# Patient Record
Sex: FEMALE | Race: WHITE | NOT HISPANIC OR LATINO | Employment: OTHER | URBAN - METROPOLITAN AREA
[De-identification: names, ages, dates, MRNs, and addresses within clinical notes are randomized per-mention and may not be internally consistent; named-entity substitution may affect disease eponyms.]

---

## 2025-03-27 ENCOUNTER — OFFICE VISIT (OUTPATIENT)
Dept: UROLOGY | Facility: CLINIC | Age: 65
End: 2025-03-27
Payer: COMMERCIAL

## 2025-03-27 DIAGNOSIS — N20.0 KIDNEY STONE: Primary | ICD-10-CM

## 2025-03-27 DIAGNOSIS — R39.9 URINARY SYMPTOM OR SIGN: ICD-10-CM

## 2025-03-27 DIAGNOSIS — R82.90 ABNORMAL FINDING ON URINALYSIS: ICD-10-CM

## 2025-03-27 DIAGNOSIS — N30.00 ACUTE CYSTITIS WITHOUT HEMATURIA: ICD-10-CM

## 2025-03-27 DIAGNOSIS — N20.0 KIDNEY STONES: ICD-10-CM

## 2025-03-27 LAB
POC APPEARANCE, URINE: CLEAR
POC BILIRUBIN, URINE: NEGATIVE
POC BLOOD, URINE: ABNORMAL
POC COLOR, URINE: YELLOW
POC GLUCOSE, URINE: NEGATIVE MG/DL
POC KETONES, URINE: NEGATIVE MG/DL
POC LEUKOCYTES, URINE: ABNORMAL
POC NITRITE,URINE: NEGATIVE
POC PH, URINE: 5.5 PH
POC PROTEIN, URINE: NEGATIVE MG/DL
POC SPECIFIC GRAVITY, URINE: 1.02
POC UROBILINOGEN, URINE: 0.2 EU/DL

## 2025-03-27 PROCEDURE — 99204 OFFICE O/P NEW MOD 45 MIN: CPT | Performed by: UROLOGY

## 2025-03-27 PROCEDURE — 1036F TOBACCO NON-USER: CPT | Performed by: UROLOGY

## 2025-03-27 PROCEDURE — 81003 URINALYSIS AUTO W/O SCOPE: CPT | Performed by: UROLOGY

## 2025-03-27 NOTE — PROGRESS NOTES
"  Patient is a 65 y.o. female presenting with gross hematuria, recurrent UTI, kidney stones, and left Bosniak 2F renal cyst.    SUBJECTIVE:  HPI   She presents as a new patient  Her friend is in attendance.  She has had recurrent UTI symptoms with gross hematuria  She presented to urgent care recently for UTI symptoms and is currently taking nitrofurantoin .   Urine culture was not sent per patient.  Her symptoms are improving.  She denies flank pain.  She has had a prior UTI with gross hematuria as well.    She has history of kidney stones.   Renal ultrasound in 12/24 identified a suspected 5 mm left upper pole stone, and 6 mm left lower pole stone.    She has history of bilateral hydronephrosis in the setting of diverticulitis. She has a colostomy.  She has met with Dr. Peters of nephrology in the past; however, has not followed routinely.    No past medical history on file.  No past surgical history on file.   No family history on file.   Tobacco Use: Low Risk  (3/27/2025)    Patient History     Smoking Tobacco Use: Never     Smokeless Tobacco Use: Never     Passive Exposure: Not on file     Review of Systems     OBJECTIVE:  There were no vitals taken for this visit.  Physical Exam     LABS:  Lab Results   Component Value Date    GLUCOSE 139 (H) 12/08/2022    CALCIUM 9.4 12/08/2022     12/08/2022    K 4.3 12/08/2022    CO2 30 12/08/2022    CL 98 12/08/2022    BUN 17 12/08/2022    CREATININE 0.8 12/08/2022     No results found for: \"URINECULTURE\"   IMAGING:  ==12/04/24==  US renal complete   IMPRESSION:   -Left lower pole Bosniak 2F renal cyst, minimally increased in size   compared to prior, but otherwise similar in appearance.   -Possible left nephrolithiasis.  No hydronephrosis.     PROCEDURES:    ASSESSMENT/PLAN:  Problem List Items Addressed This Visit    None  Visit Diagnoses       Urinary symptom or sign        Relevant Orders    POCT UA Automated manually resulted (Completed)    Abnormal finding on " urinalysis        Relevant Orders    Urine Culture    Microscopic Only, Urine           She has had recurrent UTI's. She is currently under treatment with nitrofurantoin. UA had moderate blood and trace leukocytes today.     She has suspected 5 mm left upper pole stone, and 6 mm left lower pole stone identified on renal ultrasound 12/04/24. She will have CT AP without contrast.    She has history of left lower pole Bosniak 2F renal cyst identified on renal ultrasound 12/04/24.     She has history of bilateral hydronephrosis associated with diverticulitis. She has a colostomy.    She will follow up in 2-3 weeks with repeat urinalysis at that time.    All questions were answered to the patient’s satisfaction.  Patient agrees with the plan and wishes to proceed.  Follow-up will be scheduled appropriately.     Scribe Attestation  By signing my name below, I, Christiano Foley,   attest that this documentation has been prepared under the direction and in the presence of Darnell Mcneal MD.

## 2025-03-30 DIAGNOSIS — N30.00 ACUTE CYSTITIS WITHOUT HEMATURIA: Primary | ICD-10-CM

## 2025-03-30 LAB
BACTERIA #/AREA URNS HPF: ABNORMAL /HPF
BACTERIA UR CULT: ABNORMAL
HYALINE CASTS #/AREA URNS LPF: ABNORMAL /LPF
RBC #/AREA URNS HPF: ABNORMAL /HPF
SERVICE CMNT-IMP: ABNORMAL
SQUAMOUS #/AREA URNS HPF: ABNORMAL /HPF
WBC #/AREA URNS HPF: ABNORMAL /HPF

## 2025-03-30 RX ORDER — AMOXICILLIN 500 MG/1
500 CAPSULE ORAL EVERY 8 HOURS SCHEDULED
Qty: 21 CAPSULE | Refills: 0 | Status: SHIPPED | OUTPATIENT
Start: 2025-03-30 | End: 2025-04-06

## 2025-04-02 ENCOUNTER — TELEPHONE (OUTPATIENT)
Dept: UROLOGY | Facility: CLINIC | Age: 65
End: 2025-04-02
Payer: COMMERCIAL

## 2025-04-04 DIAGNOSIS — N30.00 ACUTE CYSTITIS WITHOUT HEMATURIA: Primary | ICD-10-CM

## 2025-04-04 RX ORDER — CIPROFLOXACIN 500 MG/1
500 TABLET ORAL 2 TIMES DAILY
Qty: 14 TABLET | Refills: 0 | Status: SHIPPED | OUTPATIENT
Start: 2025-04-04 | End: 2025-04-11

## 2025-04-11 ENCOUNTER — HOSPITAL ENCOUNTER (OUTPATIENT)
Dept: RADIOLOGY | Facility: CLINIC | Age: 65
Discharge: HOME | End: 2025-04-11
Payer: MEDICARE

## 2025-04-11 DIAGNOSIS — N20.0 KIDNEY STONES: ICD-10-CM

## 2025-04-11 PROCEDURE — 74176 CT ABD & PELVIS W/O CONTRAST: CPT

## 2025-04-11 NOTE — H&P (VIEW-ONLY)
Patient is a 65 y.o. female presenting for followup with [facility] of gross hematuria, recurrent UTI, kidney stones, and left Bosniak 2F renal cyst.    SUBJECTIVE:  HPI   She has not had any recent stones pass.     She has history of recurrent UTI symptoms with gross hematuria  She has had a prior UTI with gross hematuria as well.    She has history of kidney stones.   Renal ultrasound in 12/24 identified a suspected 5 mm left upper pole stone, and 6 mm left lower pole stone.    She has history of bilateral hydronephrosis in the setting of diverticulitis. She has a colostomy.  She has met with Dr. Peters of nephrology in the past; however, has not followed routinely.    No past medical history on file.  No past surgical history on file.   No family history on file.   Tobacco Use: Low Risk  (4/14/2025)    Patient History     Smoking Tobacco Use: Never     Smokeless Tobacco Use: Never     Passive Exposure: Not on file     Review of Systems     OBJECTIVE:  There were no vitals taken for this visit.  Physical Exam   Constitutional: No obvious distress.  Eyes: Non-injected conjunctiva, sclera clear, EOMI.  Ears/Nose/Mouth/Throat: No obvious drainage per ears or nose.  Cardiovascular: Extremities are warm and well perfused. No edema, cyanosis or pallor.  Respiratory: No audible wheezing/stridor; respirations do not appear labored.  Gastrointestinal: Abdomen soft, not distended.  Musculoskeletal: Normal ROM of extremities.  Skin: No obvious rashes or open sores.  Neurologic: Alert and oriented, CN 2-12 grossly intact.  Psychiatric: Answers questions appropriately with normal affect.  Hematologic/Lymphatic/Immunologic: No obvious bruises or sites of spontaneous bleeding.  Genitourinary: No CVA tenderness, bladder not palpable.     LABS:  Lab Results   Component Value Date    WBC 11.0 12/08/2022    HGB 12.0 12/08/2022    HCT 40.5 12/08/2022    MCV 83.7 12/08/2022     12/08/2022    GLUCOSE 139 (H) 12/08/2022    CALCIUM 9.4  12/08/2022     12/08/2022    K 4.3 12/08/2022    CO2 30 12/08/2022    CL 98 12/08/2022    BUN 17 12/08/2022    CREATININE 0.8 12/08/2022    EGFR 84 12/08/2022    URINECULTURE SEE NOTE (A) 03/27/2025          IMAGING:  ==12/04/24==  US renal complete   IMPRESSION:   -Left lower pole Bosniak 2F renal cyst, minimally increased in size   compared to prior, but otherwise similar in appearance.   -Possible left nephrolithiasis.  No hydronephrosis.     === 04/11/25 ===  CT ABDOMEN PELVIS WO IV CONTRAST (Preliminary)  This result has not been signed. Information might be incomplete.  - Impression -  1. There are two nonobstructive right renal calculi measuring up to 6  mm in the right renal inter pole. No hydronephrosis.  2. Postsurgical changes of partial colectomy and a left lower  quadrant colostomy with parastomal herniation without evidence of  obstruction or strangulation.  3. Cholelithiasis without evidence acute cholecystitis.  4. Colonic diverticulosis without diverticulitis.  5. Additional chronic findings as above.    PROCEDURES:    ASSESSMENT/PLAN:  Problem List Items Addressed This Visit    None  Visit Diagnoses       Urinary symptom or sign        Relevant Orders    POCT UA Automated manually resulted (Completed)    Abnormal finding on urinalysis        Relevant Orders    Urine Culture    Microscopic Only, Urine    Kidney stones        Relevant Orders    XR abdomen 1 view          She has had recurrent UTI's. She had a E.faecalis UTI 3/27/25 treated with nitrofurantoin. UA had moderate blood today and will be sent for microscopy, culture, and sensitivity..    She has suspected 5 mm left upper pole stone, and 6 mm left lower pole stone identified on renal ultrasound 12/04/24. She will have CT AP without contrast.  Noncontrasted CT AP was viewed today and identified a new 6 mm nonobstructive right renal inter pole calculus and a 2 mm calculus within the right renal lower pole. She will repeat KUB. She will  be scheduled for right ESWL.    We discussed treatment of the 6 mm stone with right ESWL. The procedure was reviewed in detail. Risks of the procedure including infection, bleeding, injury to kidney and need for repeat procedures were reviewed. She understands to hold NSAID's for 1 week and anticoagulants for 3 days prior to her procedure.     She has history of left lower pole Bosniak 2F renal cyst identified on renal ultrasound 12/04/24.     She has history of bilateral hydronephrosis associated with diverticulitis. She has a colostomy.    All questions were answered to the patient’s satisfaction.  Patient agrees with the plan and wishes to proceed.  Follow-up will be scheduled appropriately.     Scribe Attestation  By signing my name below, I, Christiano Foley,   attest that this documentation has been prepared under the direction and in the presence of Darnell Mcneal MD.

## 2025-04-11 NOTE — PROGRESS NOTES
Patient is a 65 y.o. female presenting for followup with [facility] of gross hematuria, recurrent UTI, kidney stones, and left Bosniak 2F renal cyst.    SUBJECTIVE:  HPI   She has not had any recent stones pass.     She has history of recurrent UTI symptoms with gross hematuria  She has had a prior UTI with gross hematuria as well.    She has history of kidney stones.   Renal ultrasound in 12/24 identified a suspected 5 mm left upper pole stone, and 6 mm left lower pole stone.    She has history of bilateral hydronephrosis in the setting of diverticulitis. She has a colostomy.  She has met with Dr. Peters of nephrology in the past; however, has not followed routinely.    No past medical history on file.  No past surgical history on file.   No family history on file.   Tobacco Use: Low Risk  (4/14/2025)    Patient History     Smoking Tobacco Use: Never     Smokeless Tobacco Use: Never     Passive Exposure: Not on file     Review of Systems     OBJECTIVE:  There were no vitals taken for this visit.  Physical Exam   Constitutional: No obvious distress.  Eyes: Non-injected conjunctiva, sclera clear, EOMI.  Ears/Nose/Mouth/Throat: No obvious drainage per ears or nose.  Cardiovascular: Extremities are warm and well perfused. No edema, cyanosis or pallor.  Respiratory: No audible wheezing/stridor; respirations do not appear labored.  Gastrointestinal: Abdomen soft, not distended.  Musculoskeletal: Normal ROM of extremities.  Skin: No obvious rashes or open sores.  Neurologic: Alert and oriented, CN 2-12 grossly intact.  Psychiatric: Answers questions appropriately with normal affect.  Hematologic/Lymphatic/Immunologic: No obvious bruises or sites of spontaneous bleeding.  Genitourinary: No CVA tenderness, bladder not palpable.     LABS:  Lab Results   Component Value Date    WBC 11.0 12/08/2022    HGB 12.0 12/08/2022    HCT 40.5 12/08/2022    MCV 83.7 12/08/2022     12/08/2022    GLUCOSE 139 (H) 12/08/2022    CALCIUM 9.4  12/08/2022     12/08/2022    K 4.3 12/08/2022    CO2 30 12/08/2022    CL 98 12/08/2022    BUN 17 12/08/2022    CREATININE 0.8 12/08/2022    EGFR 84 12/08/2022    URINECULTURE SEE NOTE (A) 03/27/2025          IMAGING:  ==12/04/24==  US renal complete   IMPRESSION:   -Left lower pole Bosniak 2F renal cyst, minimally increased in size   compared to prior, but otherwise similar in appearance.   -Possible left nephrolithiasis.  No hydronephrosis.     === 04/11/25 ===  CT ABDOMEN PELVIS WO IV CONTRAST (Preliminary)  This result has not been signed. Information might be incomplete.  - Impression -  1. There are two nonobstructive right renal calculi measuring up to 6  mm in the right renal inter pole. No hydronephrosis.  2. Postsurgical changes of partial colectomy and a left lower  quadrant colostomy with parastomal herniation without evidence of  obstruction or strangulation.  3. Cholelithiasis without evidence acute cholecystitis.  4. Colonic diverticulosis without diverticulitis.  5. Additional chronic findings as above.    PROCEDURES:    ASSESSMENT/PLAN:  Problem List Items Addressed This Visit    None  Visit Diagnoses       Urinary symptom or sign        Relevant Orders    POCT UA Automated manually resulted (Completed)    Abnormal finding on urinalysis        Relevant Orders    Urine Culture    Microscopic Only, Urine    Kidney stones        Relevant Orders    XR abdomen 1 view          She has had recurrent UTI's. She had a E.faecalis UTI 3/27/25 treated with nitrofurantoin. UA had moderate blood today and will be sent for microscopy, culture, and sensitivity..    She has suspected 5 mm left upper pole stone, and 6 mm left lower pole stone identified on renal ultrasound 12/04/24. She will have CT AP without contrast.  Noncontrasted CT AP was viewed today and identified a new 6 mm nonobstructive right renal inter pole calculus and a 2 mm calculus within the right renal lower pole. She will repeat KUB. She will  be scheduled for right ESWL.    We discussed treatment of the 6 mm stone with right ESWL. The procedure was reviewed in detail. Risks of the procedure including infection, bleeding, injury to kidney and need for repeat procedures were reviewed. She understands to hold NSAID's for 1 week and anticoagulants for 3 days prior to her procedure.     She has history of left lower pole Bosniak 2F renal cyst identified on renal ultrasound 12/04/24.     She has history of bilateral hydronephrosis associated with diverticulitis. She has a colostomy.    All questions were answered to the patient’s satisfaction.  Patient agrees with the plan and wishes to proceed.  Follow-up will be scheduled appropriately.     Scribe Attestation  By signing my name below, I, Christiano Foley,   attest that this documentation has been prepared under the direction and in the presence of Darnell Mcneal MD.

## 2025-04-14 ENCOUNTER — APPOINTMENT (OUTPATIENT)
Dept: UROLOGY | Facility: CLINIC | Age: 65
End: 2025-04-14
Payer: COMMERCIAL

## 2025-04-14 ENCOUNTER — HOSPITAL ENCOUNTER (OUTPATIENT)
Dept: RADIOLOGY | Facility: HOSPITAL | Age: 65
Discharge: HOME | End: 2025-04-14
Payer: MEDICARE

## 2025-04-14 DIAGNOSIS — R82.90 ABNORMAL FINDING ON URINALYSIS: ICD-10-CM

## 2025-04-14 DIAGNOSIS — R39.9 URINARY SYMPTOM OR SIGN: ICD-10-CM

## 2025-04-14 DIAGNOSIS — N20.0 KIDNEY STONES: ICD-10-CM

## 2025-04-14 DIAGNOSIS — N20.0 RIGHT KIDNEY STONE: Primary | ICD-10-CM

## 2025-04-14 LAB
POC APPEARANCE, URINE: CLEAR
POC BILIRUBIN, URINE: NEGATIVE
POC BLOOD, URINE: ABNORMAL
POC COLOR, URINE: YELLOW
POC GLUCOSE, URINE: NEGATIVE MG/DL
POC KETONES, URINE: NEGATIVE MG/DL
POC LEUKOCYTES, URINE: NEGATIVE
POC NITRITE,URINE: NEGATIVE
POC PH, URINE: 5.5 PH
POC PROTEIN, URINE: NEGATIVE MG/DL
POC SPECIFIC GRAVITY, URINE: >=1.03
POC UROBILINOGEN, URINE: 0.2 EU/DL

## 2025-04-14 PROCEDURE — 74018 RADEX ABDOMEN 1 VIEW: CPT | Performed by: RADIOLOGY

## 2025-04-14 PROCEDURE — 1036F TOBACCO NON-USER: CPT | Performed by: UROLOGY

## 2025-04-14 PROCEDURE — 99213 OFFICE O/P EST LOW 20 MIN: CPT | Performed by: UROLOGY

## 2025-04-14 PROCEDURE — 74018 RADEX ABDOMEN 1 VIEW: CPT

## 2025-04-14 PROCEDURE — G2211 COMPLEX E/M VISIT ADD ON: HCPCS | Performed by: UROLOGY

## 2025-04-14 PROCEDURE — 81003 URINALYSIS AUTO W/O SCOPE: CPT | Performed by: UROLOGY

## 2025-04-14 ASSESSMENT — PAIN SCALES - GENERAL: PAINLEVEL_OUTOF10: 0-NO PAIN

## 2025-04-15 PROBLEM — N20.0 RIGHT KIDNEY STONE: Status: ACTIVE | Noted: 2025-04-14

## 2025-04-16 LAB
BACTERIA #/AREA URNS HPF: ABNORMAL /HPF
BACTERIA UR CULT: NORMAL
HYALINE CASTS #/AREA URNS LPF: ABNORMAL /LPF
RBC #/AREA URNS HPF: ABNORMAL /HPF
SERVICE CMNT-IMP: ABNORMAL
SQUAMOUS #/AREA URNS HPF: ABNORMAL /HPF
WBC #/AREA URNS HPF: ABNORMAL /HPF

## 2025-04-21 ENCOUNTER — LAB (OUTPATIENT)
Dept: LAB | Facility: HOSPITAL | Age: 65
End: 2025-04-21
Payer: MEDICARE

## 2025-04-21 ENCOUNTER — PRE-ADMISSION TESTING (OUTPATIENT)
Dept: PREADMISSION TESTING | Facility: HOSPITAL | Age: 65
End: 2025-04-21
Payer: MEDICARE

## 2025-04-21 VITALS
DIASTOLIC BLOOD PRESSURE: 89 MMHG | TEMPERATURE: 98.8 F | RESPIRATION RATE: 16 BRPM | SYSTOLIC BLOOD PRESSURE: 141 MMHG | WEIGHT: 174.6 LBS | HEIGHT: 61 IN | HEART RATE: 87 BPM | OXYGEN SATURATION: 98 % | BODY MASS INDEX: 32.97 KG/M2

## 2025-04-21 DIAGNOSIS — Z01.818 PREOP TESTING: Primary | ICD-10-CM

## 2025-04-21 DIAGNOSIS — N20.0 RIGHT KIDNEY STONE: ICD-10-CM

## 2025-04-21 DIAGNOSIS — Z01.818 ENCOUNTER FOR OTHER PREPROCEDURAL EXAMINATION: Primary | ICD-10-CM

## 2025-04-21 LAB
ALBUMIN SERPL BCP-MCNC: 4.4 G/DL (ref 3.4–5)
ALP SERPL-CCNC: 67 U/L (ref 33–136)
ALT SERPL W P-5'-P-CCNC: 15 U/L (ref 7–45)
ANION GAP SERPL CALC-SCNC: 15 MMOL/L (ref 10–20)
AST SERPL W P-5'-P-CCNC: 14 U/L (ref 9–39)
BASOPHILS # BLD AUTO: 0.05 X10*3/UL (ref 0–0.1)
BASOPHILS NFR BLD AUTO: 0.7 %
BILIRUB SERPL-MCNC: 0.4 MG/DL (ref 0–1.2)
BUN SERPL-MCNC: 17 MG/DL (ref 6–23)
CALCIUM SERPL-MCNC: 9.7 MG/DL (ref 8.6–10.3)
CHLORIDE SERPL-SCNC: 103 MMOL/L (ref 98–107)
CO2 SERPL-SCNC: 25 MMOL/L (ref 21–32)
CREAT SERPL-MCNC: 0.71 MG/DL (ref 0.5–1.05)
EGFRCR SERPLBLD CKD-EPI 2021: >90 ML/MIN/1.73M*2
EOSINOPHIL # BLD AUTO: 0.19 X10*3/UL (ref 0–0.7)
EOSINOPHIL NFR BLD AUTO: 2.6 %
ERYTHROCYTE [DISTWIDTH] IN BLOOD BY AUTOMATED COUNT: 13 % (ref 11.5–14.5)
GLUCOSE SERPL-MCNC: 99 MG/DL (ref 74–99)
HCT VFR BLD AUTO: 42.3 % (ref 36–46)
HGB BLD-MCNC: 13.4 G/DL (ref 12–16)
IMM GRANULOCYTES # BLD AUTO: 0.01 X10*3/UL (ref 0–0.7)
IMM GRANULOCYTES NFR BLD AUTO: 0.1 % (ref 0–0.9)
LYMPHOCYTES # BLD AUTO: 2.38 X10*3/UL (ref 1.2–4.8)
LYMPHOCYTES NFR BLD AUTO: 32.2 %
MCH RBC QN AUTO: 27 PG (ref 26–34)
MCHC RBC AUTO-ENTMCNC: 31.7 G/DL (ref 32–36)
MCV RBC AUTO: 85 FL (ref 80–100)
MONOCYTES # BLD AUTO: 0.46 X10*3/UL (ref 0.1–1)
MONOCYTES NFR BLD AUTO: 6.2 %
NEUTROPHILS # BLD AUTO: 4.31 X10*3/UL (ref 1.2–7.7)
NEUTROPHILS NFR BLD AUTO: 58.2 %
NRBC BLD-RTO: ABNORMAL /100{WBCS}
PLATELET # BLD AUTO: 342 X10*3/UL (ref 150–450)
POTASSIUM SERPL-SCNC: 4.9 MMOL/L (ref 3.5–5.3)
PROT SERPL-MCNC: 7.6 G/DL (ref 6.4–8.2)
RBC # BLD AUTO: 4.97 X10*6/UL (ref 4–5.2)
SODIUM SERPL-SCNC: 138 MMOL/L (ref 136–145)
WBC # BLD AUTO: 7.4 X10*3/UL (ref 4.4–11.3)

## 2025-04-21 PROCEDURE — 80053 COMPREHEN METABOLIC PANEL: CPT

## 2025-04-21 PROCEDURE — 99203 OFFICE O/P NEW LOW 30 MIN: CPT

## 2025-04-21 ASSESSMENT — DUKE ACTIVITY SCORE INDEX (DASI)
CAN YOU HAVE SEXUAL RELATIONS: YES
CAN YOU PARTICIPATE IN STRENOUS SPORTS LIKE SWIMMING, SINGLES TENNIS, FOOTBALL, BASKETBALL, OR SKIING: NO
CAN YOU RUN A SHORT DISTANCE: YES
CAN YOU PARTICIPATE IN MODERATE RECREATIONAL ACTIVITIES LIKE GOLF, BOWLING, DANCING, DOUBLES TENNIS OR THROWING A BASEBALL OR FOOTBALL: YES
CAN YOU DO HEAVY WORK AROUND THE HOUSE LIKE SCRUBBING FLOORS OR LIFTING AND MOVING HEAVY FURNITURE: NO
CAN YOU DO YARD WORK LIKE RAKING LEAVES, WEEDING OR PUSHING A MOWER: YES
CAN YOU WALK INDOORS, SUCH AS AROUND YOUR HOUSE: YES
DASI METS SCORE: 8
CAN YOU DO LIGHT WORK AROUND THE HOUSE LIKE DUSTING OR WASHING DISHES: YES
CAN YOU WALK A BLOCK OR TWO ON LEVEL GROUND: YES
CAN YOU TAKE CARE OF YOURSELF (EAT, DRESS, BATHE, OR USE TOILET): YES
TOTAL_SCORE: 42.7
CAN YOU DO MODERATE WORK AROUND THE HOUSE LIKE VACUUMING, SWEEPING FLOORS OR CARRYING GROCERIES: YES
CAN YOU CLIMB A FLIGHT OF STAIRS OR WALK UP A HILL: YES

## 2025-04-21 NOTE — PREPROCEDURE INSTRUCTIONS
Medication List      as of April 21, 2025  9:37 AM     You have not been prescribed any medications.       NPO Instructions:     Do not eat any food after midnight the night before your surgery/procedure.  You may have clear liquids until TWO hours before surgery/procedure. This includes water, black tea/coffee, (no milk or cream) apple juice and electrolyte drinks (Gatorade).  You may chew gum up to TWO hours before your surgery/procedure.     Additional Instructions:      Seven/Six Days before Surgery:  Review your medication instructions, stop indicated medications  Five Days before Surgery:  Review your medication instructions, stop indicated medications  Three Days before Surgery:  Review your medication instructions, stop indicated medications  The Day before Surgery:  No smoking or alcohol use 24 hours before surgery  Review your medication instructions, stop indicated medications  You will be contacted regarding the time of your arrival to facility and surgery time  Do not eat any food after Midnight  Day of Surgery:  Review your medication instructions, take indicated medications  If you have diabetes, please check your fasting blood sugar upon awakening.  If fasting blood sugar is <80 mg/dl, drink 100 ml of apple juice, time limit of 2 hours before  You may have clear liquids until TWO hours before surgery/procedure.  This includes water, black tea/coffee, (no milk or cream) apple juice and electrolyte drinks (Gatorade)  You may chew gum up to TWO hours before your surgery/procedure  Wear  comfortable loose fitting clothing  Do not use moisturizers, creams, lotions or perfume  All jewelry and valuables should be left at home     CONTACT SURGEON'S OFFICE IF YOU DEVELOP:  * Fever = 100.4 F   * New respiratory symptoms (e.g. cough, shortness of breath, respiratory distress, sore throat)  * Recent loss of taste or smell  *Flu like symptoms such as headache, fatigue or gastrointestinal symptoms  * You  develop any open sores, shingles, burning or painful urination   AND/OR:  * You no longer wish to have the surgery.  * Any other personal circumstances change that may lead to the need to cancel or defer this surgery.  *You were admitted to any hospital within one week of your planned procedure.     SMOKING:  *Quitting smoking can make a huge difference to your health and recovery from surgery.    *If you need help with quitting, call 4-602-QUIT-NOW.     THE DAY BEFORE SURGERY:  *Do not eat any food after midnight the night before your surgery.   *You may have up to TEN OUNCES of clear liquids until TWO hours before your instructed ARRIVAL TIME to hospital. This includes water, black tea/coffee, (no milk or cream) apple juice, clear broth and electrolyte drinks (Gatorade). Please avoid clear liquids that are red in color.   *You may chew gum/mints up to TWO hours before your surgery/procedure.     SURGICAL TIME:  *You will be contacted between 2 p.m. and 3 p.m. the business day before your surgery with your arrival time.  *If you haven't received a call by 3pm, call [phone]  *Scheduled surgery times may change and you will be notified if this occurs-check your personal voicemail for any updates.     ON THE MORNING OF SURGERY:  *Wear comfortable, loose fitting clothing.   *Do not use moisturizers, creams, lotions or perfume.  *All jewelry and valuables should be left at home.  *Prosthetic devices such as contact lenses, hearing aids, dentures, eyelash extensions, hairpins and body piercing must be removed before surgery.     BRING WITH YOU:  *Photo ID and insurance card  *Current list of medications and allergies  *Pacemaker/Defibrillator/Heart stent cards  *CPAP machine and mask  *Slings/splints/crutches  *Copy of your complete Advanced Directive/DHPOA-if applicable  *Neurostimulator implant remote     PARKING AND ARRIVAL:  *Check in at the Main Entrance desk and let them know you are here for surgery.     IF  YOU ARE HAVING OUTPATIENT/SAME DAY SURGERY:  *A responsible adult MUST accompany you at the time of discharge and stay with you for 24 hours after your surgery.  *You may NOT drive yourself home after surgery.  *You may use a taxi or ride sharing service ([employer], Uber) to return home ONLY if you are accompanied by a friend or family member.  *Instructions for resuming your medications will be provided by your surgeon.     Thank you for coming to Pre Admission testing.      If I have prescribed medication please don't forget to  at your pharmacy.      Any questions about today's visit call [phone] and leave a message in the general mailbox.     Patient instructed to ambulate as soon as possible postoperatively to decrease thromboembolic risk.     Sacha Morgan, APRN-CNP     Thank you for visiting [facility].  If you have any changes to your health condition, please call the surgeons office to alert them and give them details of your symptoms.        Preoperative Fasting Guidelines     Why must I stop eating and drinking near surgery time?  With sedation, food or liquid in your stomach can enter your lungs causing serious complications  Increases nausea and vomiting     When do I need to stop eating and drinking before my surgery?  Do not eat any food after midnight the night before your surgery/procedure.  You may have up to TEN ounces of clear liquid until TWO hours before your instructed arrival time to the hospital.  This includes water, black tea/coffee, (no milk or cream) apple juice, and electrolyte drinks (Gatorade)  You may chew gum until TWO hours before your surgery/procedure        Additional Instructions:      The Day before Surgery:  -Review your medication instructions, stop indicated medications  -You will be contacted in the evening regarding the time of your arrival to facility and surgery time     Day of Surgery:  -Review your medication instructions, take  indicated medications  -Wear comfortable loose fitting clothing  -Do not use moisturizers, creams, lotions or perfume  -All jewelry and valuables should be left at home                   Preoperative Brain Exercises     What are brain exercises?  A brain exercise is any activity that engages your thinking (cognitive) skills.     What types of activities are considered brain exercises?  Jigsaw puzzles, crossword puzzles, word jumble, memory games, word search, and many more.  Many can be found free online or on your phone via a mobile chiara.     Why should I do brain exercises before my surgery?  More recent research has shown brain exercise before surgery can lower the risk of postoperative delirium (confusion) which can be especially important for older adults.  Patients who did brain exercises for 5 to 10 minutes/day in the days before surgery, cut their risk of postoperative delirium in half up to 1 week after surgery.                         [facility]     Preoperative Deep Breathing Exercises     Why it is important to do deep breathing exercises before my surgery?  Deep breathing exercises strengthen your breathing muscles.  This helps you to recover after your surgery and decreases the chance of breathing complications.        How are the deep breathing exercises done?  Sit straight with your back supported.  Breathe in deeply and slowly through your nose. Your lower rib cage should expand and your abdomen may move forward.  Hold that breath for 3 to 5 seconds.  Breathe out through pursed lips, slowly and completely.  Rest and repeat 10 times every hour while awake.  Rest longer if you become dizzy or lightheaded.                      [facility]     Preoperative Deep Breathing Exercises     Why it is important to do deep breathing exercises before my surgery?  Deep breathing exercises strengthen your breathing muscles.  This helps you to recover after your  surgery and decreases the chance of breathing complications.        How are the deep breathing exercises done?  Sit straight with your back supported.  Breathe in deeply and slowly through your nose. Your lower rib cage should expand and your abdomen may move forward.  Hold that breath for 3 to 5 seconds.  Breathe out through pursed lips, slowly and completely.  Rest and repeat 10 times every hour while awake.  Rest longer if you become dizzy or lightheaded.        Patient Information: Incentive Spirometer  What is an incentive spirometer?  An incentive spirometer is a device used before and after surgery to “exercise” your lungs.  It helps you to take deeper breaths to expand your lungs.  Below is an example of a basic incentive spirometer.  The device you receive may differ slightly but they all function the same.    Why do I need to use an incentive spirometer?  Using your incentive spirometer prepares your lungs for surgery and helps prevent lung problems after surgery.  How do I use my incentive spirometer?  When you're using your incentive spirometer, make sure to breathe through your mouth. If you breathe through your nose, the incentive spirometer won't work properly. You can hold your nose if you have trouble.  If you feel dizzy at any time, stop and rest. Try again at a later time.  Follow the steps below:  Set up your incentive spirometer, expand the flexible tubing and connect to the outlet.  Sit upright in a chair or bed. Hold the incentive spirometer at eye level.   Put the mouthpiece in your mouth and close your lips tightly around it. Slowly breathe out (exhale) completely.  Breathe in (inhale) slowly through your mouth as deeply as you can. As you take a breath, you will see the piston rise inside the large column. While the piston rises, the indicator should move upwards. It should stay in between the 2 arrows (see Figure).  Try to get the piston as high as you can, while keeping the indicator  between the arrows.   If the indicator doesn't stay between the arrows, you're breathing either too fast or too slow.  When you get it as high as you can, hold your breath for 10 seconds, or as long as possible. While you're holding your breath, the piston will slowly fall to the base of the spirometer.  Once the piston reaches the bottom of the spirometer, breathe out slowly through your mouth. Rest for a few seconds.  Repeat 10 times. Try to get the piston to the same level with each breath.  Repeat every hour while awake  You can carefully clean the outside of the mouthpiece with an alcohol wipe or soap and water.       Patient and Family Education             Ways You Can Help Prevent Blood Clots                    This handout explains some simple things you can do to help prevent blood clots.      Blood clots are blockages that can form in the body's veins. When a blood clot forms in your deep veins, it may be called a deep vein thrombosis, or DVT for short. Blood clots can happen in any part of the body where blood flows, but they are most common in the arms and legs. If a piece of a blood clot breaks free and travels to the lungs, it is called a pulmonary embolus (PE). A PE can be a very serious problem.         Being in the hospital or having surgery can raise your chances of getting a blood clot because you may not be well enough to move around as much as you normally do.         Ways you can help prevent blood clots in the hospital           Wearing SCDs. SCDs stands for Sequential Compression Devices.   SCDs are special sleeves that wrap around your legs  They attach to a pump that fills them with air to gently squeeze your legs every few minutes.   This helps return the blood in your legs to your heart.   SCDs should only be taken off when walking or bathing.   SCDs may not be comfortable, but they can help save your life.                                            Wearing compression stockings - if  your doctor orders them. These special snug fitting stockings gently squeeze your legs to help blood flow.       Walking. Walking helps move the blood in your legs.   If your doctor says it is ok, try walking the halls at least   5 times a day. Ask us to help you get up, so you don't fall.      Taking any blood thinning medicines your doctor orders.        Page 1 of 2            [facility]; 3/23   Ways you can help prevent blood clots at home         Wearing compression stockings - if your doctor orders them. ? Walking - to help move the blood in your legs.       Taking any blood thinning medicines your doctor orders.      Signs of a blood clot or PE        Tell your doctor or nurse know right away if you have of the problems listed below.    If you are at home, seek medical care right away. Call 911 for chest pain or problems breathing.                Signs of a blood clot (DVT) - such as pain,  swelling, redness or warmth in your arm or leg      Signs of a pulmonary embolism (PE) - such as chest pain or feeling short of breath

## 2025-04-21 NOTE — CPM/PAT H&P
[facility]/PAT Evaluation       Name: Mariana Wharton (Mariana Wharton)  /Age: 1960/65 y.o.     In-Person       Chief Complaint: Right kidney stone     HPI  Patient is an alert and oriented 65 year old female scheduled for a right cystoscopy with pyeologram and right extracorporeal shockwave lithotripsy on 2025 with Dr. Mcneal for right kidney stone. She denies pain but does endorse intermittent hematuria. PMHX includes kidney stones, obesity, and diverticulitis. Presents to [facility] today for perioperative risk stratification and optimization.     Medical History[1]    Surgical History[2]    Patient  has no history on file for sexual activity.    Family History[3]    Allergies[4]    Prior to Admission medications    Not on File       Review of Systems   Constitutional: Negative for chills, decreased appetite, diaphoresis, fever and malaise/fatigue.   Eyes:  Negative for blurred vision and double vision.   Cardiovascular:  Negative for chest pain, claudication, cyanosis, dyspnea on exertion, irregular heartbeat, leg swelling, near-syncope and palpitations.   Respiratory:  Negative for cough, hemoptysis, shortness of breath and wheezing.    Endocrine: Negative for cold intolerance, heat intolerance, polydipsia, polyphagia and polyuria.   Gastrointestinal:  Negative for abdominal pain, constipation, diarrhea, dysphagia, nausea and vomiting.   Genitourinary:  Negative for bladder incontinence, dysuria, incomplete emptying, nocturia, frequency, pelvic pain and urgency. Positive for intermittent hematuria.   Neurological:  Negative for headaches, light-headedness, paresthesias, sensory change and weakness.   Psychiatric/Behavioral:  Negative for altered mental status.    Musculoskeletal: Negative for myalgias, arthralgias     Vitals and nursing note reviewed.     Physical exam  Constitutional:       Appearance: Normal appearance. She is Obese.   HENT:      Head: Normocephalic and atraumatic.      Mouth/Throat:       "Mouth: Mucous membranes are moist.      Pharynx: Oropharynx is clear.   Eyes:      Extraocular Movements: Extraocular movements intact.      Conjunctiva/sclera: Conjunctivae normal.      Pupils: Pupils are equal, round, and reactive to light.   Cardiovascular:      PMI at left midclavicular line. Normal rate. Regular rhythm. Normal S1. Normal S2.       Murmurs: There is no murmur.      No gallop.  No click. No rub.       No audible carotid bruit     No lower extremity edema on exam  Pulmonary:      Effort: Pulmonary effort is normal.      Breath sounds: Normal breath sounds.   Abdominal:      General: Abdomen is flat. Bowel sounds are normal.      Palpations: Abdomen is soft and non-tender  Musculoskeletal:      Cervical back: Normal range of motion and neck supple.   Skin:     General: Skin is warm and dry.      Capillary Refill: Capillary refill takes less than 2 seconds.   Neurological:      General: No focal deficit present.      Mental Status: She is alert and oriented to person, place, and time. Mental status is at baseline.   Psychiatric:         Mood and Affect: Mood normal.         Behavior: Behavior normal.         Thought Content: Thought content normal.         Judgment: Judgment normal.     Vitals and nursing note reviewed. Physical exam within normal limits.     Visit Vitals  /89   Pulse 87   Temp 37.1 °C (98.8 °F) (Temporal)   Resp 16   Ht 1.549 m (5' 1\")   Wt 79.2 kg (174 lb 9.7 oz)   SpO2 98%   BMI 32.99 kg/m²   Smoking Status Never   BSA 1.85 m²     DASI Risk Score      Flowsheet Row Pre-Admission Testing from 4/21/[facility] in [facility]   Can you take care of yourself (eat, dress, bathe, or use toilet)?  2.75 filed at 04/21/2025 0948   Can you walk indoors, such as around your house? 1.75 filed at 04/21/2025 0948   Can you walk a block or two on level ground?  2.75 filed at 04/21/2025 0948   Can you climb a flight of stairs or walk up a hill? 5.5 filed at 04/21/2025 0948   Can " you run a short distance? 8 filed at 04/21/2025 0948   Can you do light work around the house like dusting or washing dishes? 2.7 filed at 04/21/2025 0948   Can you do moderate work around the house like vacuuming, sweeping floors or carrying groceries? 3.5 filed at 04/21/2025 0948   Can you do heavy work around the house like scrubbing floors or lifting and moving heavy furniture?  0 filed at 04/21/2025 0948   Can you do yard work like raking leaves, weeding or pushing a mower? 4.5 filed at 04/21/2025 0948   Can you have sexual relations? 5.25 filed at 04/21/2025 0948   Can you participate in moderate recreational activities like golf, bowling, dancing, doubles tennis or throwing a baseball or football? 6 filed at 04/21/2025 0948   Can you participate in strenous sports like swimming, singles tennis, football, basketball, or skiing? 0 filed at 04/21/2025 0948   DASI SCORE 42.7 filed at 04/21/2025 0948   METS Score (Will be calculated only when all the questions are answered) 8 filed at 04/21/2025 0948          Caprini DVT Assessment      Flowsheet Row Pre-Admission Testing from 4/21/2025 in [facility]   DVT Score (IF A SCORE IS NOT CALCULATING, MUST SELECT A BMI TO COMPLETE) 10 filed at 04/21/2025 0948   Medical Factors History of DVT/PE filed at 04/21/2025 0948   Surgical Factors Major surgery planned, including arthroscopic and laproscopic (1-2 hours) filed at 04/21/2025 0948   BMI (BMI MUST BE CHOSEN) 31-40 (Obesity) filed at 04/21/2025 0948          Modified Frailty Index      Flowsheet Row Pre-Admission Testing from 4/21/2025 in [facility]   Non-independent functional status (problems with dressing, bathing, personal grooming, or cooking) 0 filed at 04/21/2025 0949   History of diabetes mellitus  0 filed at 04/21/2025 0949   History of COPD 0 filed at 04/21/2025 0949   History of CHF No filed at 04/21/2025 0949   History of MI 0 filed at 04/21/2025 0949   History of  Percutaneous Coronary Intervention, Cardiac Surgery, or Angina No filed at 04/21/2025 0949   Hypertension requiring the use of medication  0 filed at 04/21/2025 0949   Peripheral vascular disease 0 filed at 04/21/2025 0949   Impaired sensorium (cognitive impairement or loss, clouding, or delirium) 0 filed at 04/21/2025 0949   TIA or CVA withouy residual deficit 0 filed at 04/21/2025 0949   Cerebrovascular accident with deficit 0 filed at 04/21/2025 0949   Modified Frailty Index Calculator 0 filed at 04/21/2025 0949          [id number] Stroke Risk Points  Current as of just now        N/A 0 to 9 Points:      Last Change: N/A          The [id number] risk score (Lip AURELIA, et al. 2009. © 2010 American College [employer]) quantifies the risk of stroke for a patient with atrial fibrillation. For patients without atrial fibrillation or under the age of 18 this score appears as N/A. Higher score values generally indicate higher risk of stroke.        This score is not applicable to this patient. Components are not calculated.          Revised Cardiac Risk Index      Flowsheet Row Pre-Admission Testing from 4/21/2025 in [facility]   High-Risk Surgery (Intraperitoneal, Intrathoracic,Suprainguinal vascular) 0 filed at 04/21/2025 0949   History of ischemic heart disease (History of MI, History of positive exercuse test, Current chest paint considered due to myocardial ischemia, Use of nitrate therapy, ECG with pathological Q Waves) 0 filed at 04/21/2025 0949   History of congestive heart failure (pulmonary edemia, bilateral rales or S3 gallop, Paroxysmal nocturnal dyspnea, CXR showing pulmonary vascular redistribution) 0 filed at 04/21/2025 0949   History of cerebrovascular disease (Prior TIA or stroke) 0 filed at 04/21/2025 0949   Pre-operative insulin treatment 0 filed at 04/21/2025 0949   Pre-operative creatinine>2 mg/dl 0 filed at 04/21/2025 0949   Revised Cardiac Risk Calculator 0 filed at  04/21/2025 0949          Apfel Simplified Score    No data to display       Risk Analysis Index Results This Encounter    No data found in the last 10 encounters.       Stop Bang Score      Flowsheet Row Pre-Admission Testing from 4/21/2025 in [facility]   Do you snore loudly? 0 filed at 04/21/2025 0927   Do you often feel tired or fatigued after your sleep? 1 filed at 04/21/2025 0927   Has anyone ever observed you stop breathing in your sleep? 0 filed at 04/21/2025 0927   Do you have or are you being treated for high blood pressure? 0 filed at 04/21/2025 0927   Recent BMI (Calculated) 33 filed at 04/21/2025 0927   Is BMI greater than 35 kg/m2? 0=No filed at 04/21/2025 0927   Age older than 50 years old? 1=Yes filed at 04/21/2025 0927   Is your neck circumference greater than 17 inches (Male) or 16 inches (Female)? 0 filed at 04/21/2025 0927   Gender - Male 0=No filed at 04/21/2025 0927   STOP-BANG Total Score 2 filed at 04/21/2025 0927          Prodigy: High Risk  Total Score: 8              Prodigy Age Score           ARISCAT Score for Postoperative Pulmonary Complications      Flowsheet Row Pre-Admission Testing from 4/21/2025 in [facility]   Age Calculated Score 3 filed at 04/21/2025 0950   Preoperative SpO2 0 filed at 04/21/2025 0950   Respiratory infection in the last month Either upper or lower (i.e., URI, bronchitis, pneumonia), with fever and antibiotic treatment 0 filed at 04/21/2025 0950   Preoperative anemia (Hgb less than 10 g/dl) 0 filed at 04/21/2025 0950   Surgical incision  0 filed at 04/21/2025 0950   Duration of surgery  0 filed at 04/21/2025 0950   Emergency Procedure  0 filed at 04/21/2025 0950   ARISCAT Total Score  3 filed at 04/21/2025 0950          Muhammad Perioperative Risk for Myocardial Infarction or Cardiac Arrest (ARTURO)      Flowsheet Row Pre-Admission Testing from 4/21/2025 in [facility]   Calculated Age Score 1.26 filed at 04/21/2025  0949   Functional Status  0 filed at 04/21/2025 0949   ASA Class  -3.29 filed at 04/21/2025 0949   Creatinine 0 filed at 04/21/2025 0949   Type of Procedure  -0.26 filed at 04/21/2025 0949   ARTURO Total Score  -7.54 filed at 04/21/2025 0949   ARTURO % 0.05 filed at 04/21/2025 0949          Assessment & Plan:    Neuro:  No diagnosis or significant findings on chart review or clinical presentation and evaluation.     HEENT/Airway:  No diagnosis or significant findings on chart review or clinical presentation and evaluation.   STOP-BANG Score-2 points low risk for HAMILTON    Mallampati::  II    TM distance::  >3 FB    Neck ROM::  Full  Dentures-denies  Crowns-denies  Implants-denies    Cardiovascular:  No significant findings on chart review or clinical presentation and evaluation.   History of Deep vein thrombosis-Occurred in 2023 in her LLE. She states she was briefly managed on OAC which have since been discontinued. No reoccurrence.   METS: 8  RCRI: 0 points, 3.9%  risk for postoperative MACE   ARTURO: 0.05% risk for postoperative MACE    Pulmonary:  No diagnosis or significant findings on chart review or clinical presentation and evaluation.   ARISCAT: <26 points, 1.6% risk of in-hospital postoperative pulmonary complication  PRODIGY: Low risk for opioid induced respiratory depression  Smoking History-She has never smoked.  Discussed smoking cessation and deep breathing handout given    Renal/Urinary:  No diagnosis or significant findings on chart review or clinical presentation and evaluation, however, the patient is at increased risk of perioperative renal complications secondary to age>/= 56. Preventative measures include BP monitoring, medication compliance, and hydration management.   CMP-Reviewed, stable  Creatinine-0.71  GFR->90  UC-Mixed genital jessica isolated    Endocrine:  No diagnosis or significant findings on chart review or clinical presentation and evaluation.     Hematologic/Immunology:  No diagnosis or  significant findings on chart review or clinical presentation and evaluation.  The patient is not a Jehovah’s witness and will accept blood and blood products if medically indicated.   History of previous blood transfusions Yes - No reported transfusion reaction  CBC-Reviewed, stable  HGB-13.4  Caprini Score 10, patient at High for postoperative DVT. Pt supplied education/VTE handout  Anticoagulation use: No     Gastrointestinal:   No diagnosis or significant findings on chart review or clinical presentation and evaluation.   Recreational drug use: alcohol  Alcohol use social drinker    Infectious disease:   No diagnosis or significant findings on chart review or clinical presentation and evaluation.     Musculoskeletal:   No diagnosis on chart review or clinical presentation and evaluation.   Positive for Obesity-BMI 32.99  JHFRAT score-0 points. low risk for falls    Anesthesia:  ASA 2 - Patient with mild systemic disease with no functional limitations  Anticipated anesthesia-general  History of General anesthesia- yes  Complications- No anesthesia complications  No family history of anesthesia complications    Labs & Imaging ordered:  CBC, CMP  Nickel/metal allergy-negative  Shellfish allergy-negative    Overall, patient Low Risk for the scheduled Low Risk surgery. Discussed with patient medication instructions, NPO guidelines, and any questions or concerns.     Face to face time spent 45 minutes        [1]   Past Medical History:  Diagnosis Date    DVT (deep venous thrombosis) (Multi)     Kidney stones     Obesity    [2]   Past Surgical History:  Procedure Laterality Date     SECTION, CLASSIC      COLECTOMY PARTIAL / TOTAL     [3] No family history on file.  [4]   Allergies  Allergen Reactions    Amoxicillin Rash

## 2025-04-23 ENCOUNTER — ANESTHESIA EVENT (OUTPATIENT)
Dept: OPERATING ROOM | Facility: HOSPITAL | Age: 65
End: 2025-04-23
Payer: MEDICARE

## 2025-04-23 ENCOUNTER — APPOINTMENT (OUTPATIENT)
Dept: RADIOLOGY | Facility: HOSPITAL | Age: 65
End: 2025-04-23
Payer: MEDICARE

## 2025-04-23 ENCOUNTER — ANESTHESIA (OUTPATIENT)
Dept: OPERATING ROOM | Facility: HOSPITAL | Age: 65
End: 2025-04-23
Payer: MEDICARE

## 2025-04-23 ENCOUNTER — HOSPITAL ENCOUNTER (OUTPATIENT)
Facility: HOSPITAL | Age: 65
Setting detail: OUTPATIENT SURGERY
Discharge: HOME | End: 2025-04-23
Attending: UROLOGY | Admitting: UROLOGY
Payer: MEDICARE

## 2025-04-23 VITALS
SYSTOLIC BLOOD PRESSURE: 150 MMHG | HEART RATE: 82 BPM | BODY MASS INDEX: 33.22 KG/M2 | TEMPERATURE: 97.7 F | WEIGHT: 175.93 LBS | OXYGEN SATURATION: 96 % | HEIGHT: 61 IN | DIASTOLIC BLOOD PRESSURE: 89 MMHG | RESPIRATION RATE: 16 BRPM

## 2025-04-23 DIAGNOSIS — N20.0 RIGHT KIDNEY STONE: Primary | ICD-10-CM

## 2025-04-23 PROBLEM — O22.30 DVT (DEEP VEIN THROMBOSIS) IN PREGNANCY (HHS-HCC): Status: ACTIVE | Noted: 2025-04-23

## 2025-04-23 PROBLEM — I82.409 DVT (DEEP VENOUS THROMBOSIS) (MULTI): Status: ACTIVE | Noted: 2025-04-23

## 2025-04-23 PROCEDURE — 2500000004 HC RX 250 GENERAL PHARMACY W/ HCPCS (ALT 636 FOR OP/ED): Mod: JZ

## 2025-04-23 PROCEDURE — 50590 FRAGMENTING OF KIDNEY STONE: CPT | Performed by: UROLOGY

## 2025-04-23 PROCEDURE — 3600000003 HC OR TIME - INITIAL BASE CHARGE - PROCEDURE LEVEL THREE: Performed by: UROLOGY

## 2025-04-23 PROCEDURE — 3700000001 HC GENERAL ANESTHESIA TIME - INITIAL BASE CHARGE: Performed by: UROLOGY

## 2025-04-23 PROCEDURE — 7100000001 HC RECOVERY ROOM TIME - INITIAL BASE CHARGE: Performed by: UROLOGY

## 2025-04-23 PROCEDURE — A50590 PR FRAGMENT KIDNEY STONE/ ESWL

## 2025-04-23 PROCEDURE — 3700000002 HC GENERAL ANESTHESIA TIME - EACH INCREMENTAL 1 MINUTE: Performed by: UROLOGY

## 2025-04-23 PROCEDURE — 2550000001 HC RX 255 CONTRASTS: Performed by: UROLOGY

## 2025-04-23 PROCEDURE — 74018 RADEX ABDOMEN 1 VIEW: CPT | Performed by: RADIOLOGY

## 2025-04-23 PROCEDURE — 52005 CYSTO W/URTRL CATHJ: CPT | Performed by: UROLOGY

## 2025-04-23 PROCEDURE — 3600000008 HC OR TIME - EACH INCREMENTAL 1 MINUTE - PROCEDURE LEVEL THREE: Performed by: UROLOGY

## 2025-04-23 PROCEDURE — 2500000004 HC RX 250 GENERAL PHARMACY W/ HCPCS (ALT 636 FOR OP/ED): Mod: JZ | Performed by: UROLOGY

## 2025-04-23 PROCEDURE — 2720000007 HC OR 272 NO HCPCS: Performed by: UROLOGY

## 2025-04-23 PROCEDURE — 7100000002 HC RECOVERY ROOM TIME - EACH INCREMENTAL 1 MINUTE: Performed by: UROLOGY

## 2025-04-23 PROCEDURE — 7100000010 HC PHASE TWO TIME - EACH INCREMENTAL 1 MINUTE: Performed by: UROLOGY

## 2025-04-23 PROCEDURE — 7100000009 HC PHASE TWO TIME - INITIAL BASE CHARGE: Performed by: UROLOGY

## 2025-04-23 PROCEDURE — 74420 UROGRAPHY RTRGR +-KUB: CPT | Performed by: UROLOGY

## 2025-04-23 PROCEDURE — 74018 RADEX ABDOMEN 1 VIEW: CPT

## 2025-04-23 PROCEDURE — A50590 PR FRAGMENT KIDNEY STONE/ ESWL: Performed by: ANESTHESIOLOGY

## 2025-04-23 DEVICE — ENDOSCOPIC VALVE WITH ADAPTER.
Type: IMPLANTABLE DEVICE | Site: URETER | Status: NON-FUNCTIONAL
Brand: SURSEAL® II

## 2025-04-23 RX ORDER — OXYCODONE HYDROCHLORIDE 5 MG/1
5 TABLET ORAL EVERY 4 HOURS PRN
Refills: 0 | Status: CANCELLED | OUTPATIENT
Start: 2025-04-23

## 2025-04-23 RX ORDER — SODIUM CHLORIDE, SODIUM LACTATE, POTASSIUM CHLORIDE, CALCIUM CHLORIDE 600; 310; 30; 20 MG/100ML; MG/100ML; MG/100ML; MG/100ML
INJECTION, SOLUTION INTRAVENOUS CONTINUOUS PRN
Status: DISCONTINUED | OUTPATIENT
Start: 2025-04-23 | End: 2025-04-23

## 2025-04-23 RX ORDER — PROPOFOL 10 MG/ML
INJECTION, EMULSION INTRAVENOUS AS NEEDED
Status: DISCONTINUED | OUTPATIENT
Start: 2025-04-23 | End: 2025-04-23

## 2025-04-23 RX ORDER — TRAMADOL HYDROCHLORIDE 50 MG/1
50 TABLET ORAL EVERY 6 HOURS PRN
Qty: 10 TABLET | Refills: 0 | Status: SHIPPED | OUTPATIENT
Start: 2025-04-23

## 2025-04-23 RX ORDER — PHENYLEPHRINE HCL IN 0.9% NACL 1 MG/10 ML
SYRINGE (ML) INTRAVENOUS AS NEEDED
Status: DISCONTINUED | OUTPATIENT
Start: 2025-04-23 | End: 2025-04-23

## 2025-04-23 RX ORDER — ONDANSETRON HYDROCHLORIDE 2 MG/ML
4 INJECTION, SOLUTION INTRAVENOUS ONCE AS NEEDED
Status: CANCELLED | OUTPATIENT
Start: 2025-04-23

## 2025-04-23 RX ORDER — CIPROFLOXACIN 2 MG/ML
400 INJECTION, SOLUTION INTRAVENOUS ONCE
Status: COMPLETED | OUTPATIENT
Start: 2025-04-23 | End: 2025-04-23

## 2025-04-23 RX ORDER — LIDOCAINE HYDROCHLORIDE 10 MG/ML
INJECTION, SOLUTION EPIDURAL; INFILTRATION; INTRACAUDAL; PERINEURAL AS NEEDED
Status: DISCONTINUED | OUTPATIENT
Start: 2025-04-23 | End: 2025-04-23

## 2025-04-23 RX ORDER — LABETALOL HYDROCHLORIDE 5 MG/ML
5 INJECTION, SOLUTION INTRAVENOUS ONCE AS NEEDED
Status: CANCELLED | OUTPATIENT
Start: 2025-04-23

## 2025-04-23 RX ORDER — FENTANYL CITRATE 50 UG/ML
50 INJECTION, SOLUTION INTRAMUSCULAR; INTRAVENOUS EVERY 5 MIN PRN
Refills: 0 | Status: CANCELLED | OUTPATIENT
Start: 2025-04-23

## 2025-04-23 RX ORDER — MEPERIDINE HYDROCHLORIDE 25 MG/ML
12.5 INJECTION INTRAMUSCULAR; INTRAVENOUS; SUBCUTANEOUS EVERY 10 MIN PRN
Status: CANCELLED | OUTPATIENT
Start: 2025-04-23

## 2025-04-23 RX ORDER — MIDAZOLAM HYDROCHLORIDE 1 MG/ML
INJECTION, SOLUTION INTRAMUSCULAR; INTRAVENOUS AS NEEDED
Status: DISCONTINUED | OUTPATIENT
Start: 2025-04-23 | End: 2025-04-23

## 2025-04-23 RX ORDER — LIDOCAINE HYDROCHLORIDE 10 MG/ML
0.1 INJECTION, SOLUTION EPIDURAL; INFILTRATION; INTRACAUDAL; PERINEURAL ONCE
Status: CANCELLED | OUTPATIENT
Start: 2025-04-23 | End: 2025-04-23

## 2025-04-23 RX ORDER — ONDANSETRON HYDROCHLORIDE 2 MG/ML
INJECTION, SOLUTION INTRAVENOUS AS NEEDED
Status: DISCONTINUED | OUTPATIENT
Start: 2025-04-23 | End: 2025-04-23

## 2025-04-23 RX ORDER — FENTANYL CITRATE 50 UG/ML
INJECTION, SOLUTION INTRAMUSCULAR; INTRAVENOUS AS NEEDED
Status: DISCONTINUED | OUTPATIENT
Start: 2025-04-23 | End: 2025-04-23

## 2025-04-23 RX ORDER — FENTANYL CITRATE 50 UG/ML
25 INJECTION, SOLUTION INTRAMUSCULAR; INTRAVENOUS EVERY 5 MIN PRN
Refills: 0 | Status: CANCELLED | OUTPATIENT
Start: 2025-04-23

## 2025-04-23 RX ADMIN — FENTANYL CITRATE 25 MCG: 0.05 INJECTION, SOLUTION INTRAMUSCULAR; INTRAVENOUS at 13:52

## 2025-04-23 RX ADMIN — ONDANSETRON 4 MG: 2 INJECTION INTRAMUSCULAR; INTRAVENOUS at 13:33

## 2025-04-23 RX ADMIN — Medication 100 MCG: at 14:01

## 2025-04-23 RX ADMIN — DEXAMETHASONE SODIUM PHOSPHATE 4 MG: 4 INJECTION INTRA-ARTICULAR; INTRALESIONAL; INTRAMUSCULAR; INTRAVENOUS; SOFT TISSUE at 13:33

## 2025-04-23 RX ADMIN — SODIUM CHLORIDE, POTASSIUM CHLORIDE, SODIUM LACTATE AND CALCIUM CHLORIDE: 600; 310; 30; 20 INJECTION, SOLUTION INTRAVENOUS at 13:23

## 2025-04-23 RX ADMIN — MIDAZOLAM 1 MG: 1 INJECTION INTRAMUSCULAR; INTRAVENOUS at 13:23

## 2025-04-23 RX ADMIN — Medication 100 MCG: at 13:41

## 2025-04-23 RX ADMIN — LIDOCAINE HYDROCHLORIDE 5 ML: 10 INJECTION, SOLUTION EPIDURAL; INFILTRATION; INTRACAUDAL; PERINEURAL at 13:27

## 2025-04-23 RX ADMIN — Medication 100 MCG: at 13:34

## 2025-04-23 RX ADMIN — CIPROFLOXACIN 400 MG: 2 INJECTION, SOLUTION INTRAVENOUS at 13:23

## 2025-04-23 RX ADMIN — Medication 100 MCG: at 13:51

## 2025-04-23 RX ADMIN — FENTANYL CITRATE 25 MCG: 0.05 INJECTION, SOLUTION INTRAMUSCULAR; INTRAVENOUS at 13:26

## 2025-04-23 RX ADMIN — PROPOFOL 150 MG: 10 INJECTION, EMULSION INTRAVENOUS at 13:27

## 2025-04-23 RX ADMIN — Medication 200 MCG: at 13:43

## 2025-04-23 RX ADMIN — Medication 200 MCG: at 14:05

## 2025-04-23 RX ADMIN — Medication 100 MCG: at 13:59

## 2025-04-23 ASSESSMENT — PAIN - FUNCTIONAL ASSESSMENT
PAIN_FUNCTIONAL_ASSESSMENT: 0-10

## 2025-04-23 ASSESSMENT — PAIN SCALES - GENERAL
PAINLEVEL_OUTOF10: 0 - NO PAIN

## 2025-04-23 ASSESSMENT — COLUMBIA-SUICIDE SEVERITY RATING SCALE - C-SSRS
1. IN THE PAST MONTH, HAVE YOU WISHED YOU WERE DEAD OR WISHED YOU COULD GO TO SLEEP AND NOT WAKE UP?: NO
2. HAVE YOU ACTUALLY HAD ANY THOUGHTS OF KILLING YOURSELF?: NO
6. HAVE YOU EVER DONE ANYTHING, STARTED TO DO ANYTHING, OR PREPARED TO DO ANYTHING TO END YOUR LIFE?: NO

## 2025-04-23 NOTE — PERIOPERATIVE NURSING NOTE
Pt did well  in recovery. Pt assisted to bathroom. Voided without difficulty. Pt tolerates po well. No c/o pain or ponv. Pt ready for phase 2.

## 2025-04-23 NOTE — OP NOTE
LITHOTRIPSY, ESWL (R), CYSTOSCOPY, WITH RETROGRADE PYELOGRAM (ESWL REP BLADIMIR SAGE) (R) Operative Note     Date: 2025  OR Location: [facility]    Name: Mariana Wharton, : 1960, Age: 65 y.o., MRN: [id number], Sex: female    Diagnosis  Pre-op Diagnosis      * Right kidney stone [N20.0] Post-op Diagnosis     * Right kidney stone [N20.0]     Procedures  LITHOTRIPSY, ESWL  14183 - [place] LITHOTRIPSY XTRCORP SHOCK WAVE    CYSTOSCOPY, WITH RETROGRADE PYELOGRAM (ESWL REP BLADIMIR SAGE)  24876 - [place] CYSTOURETHROSCOPY W/URETERAL CATHETERIZATION      Surgeons      * Darnell Mcneal - Primary    Resident/Fellow/Other Assistant:  Surgeons and Role:  * No surgeons found with a matching role *    Staff:   Circulator: Leena Bronson Person: Poonam    Anesthesia Staff: Anesthesiologist: Madi Sampson MD  CRNA: EDY Smith-CESARIO    Procedure Summary  Anesthesia: General  ASA: II  Estimated Blood Loss: 5 mL  Intra-op Medications: Administrations occurring from 1410 to 1535 on 25:  * No intraprocedure medications in log *        Specimen: No specimens collected                Drains and/or Catheters: None    Findings: Stone fragmented right kidney    Indications: Mariana Wharton is an 65 y.o. female who is having surgery for Right kidney stone [N20.0].  She elected for treatment with shockwave lithotripsy.  Risks including infection, bleeding, injury to the kidney, obstruction from fragments, need for further procedures were reviewed.  Written consent was obtained.    Procedure Details: She was taken to the operating room and given general anesthesia.  She was positioned supine.  To facilitate identification of the stone, cystoscopy with a right retrograde pyelogram was performed using an open-ended ureteral catheter.  The stone was visualized and a total of 3000 shocks were delivered.  There appeared to be good fragmentation of the stone.  She tolerated the procedure was transferred to the recovery room in  stable condition.    Complications:  None; patient tolerated the procedure well.              Darnell Mcneal  Phone Number: [phone]

## 2025-04-23 NOTE — DISCHARGE INSTRUCTIONS
Follow up in 3 weeks.    Call or go to emergency department with a fever.  You may eat a regular diet  You may shower.  Avoid strenuous activity for 24 hours  Strain urine    Contact information:  Urology main number: [phone]  Urology : [phone]  Urology nurse: [phone]  Dr. Mcneal [place] for emergency: [phone]

## 2025-04-23 NOTE — ANESTHESIA POSTPROCEDURE EVALUATION
Patient: Mariana Wharton    Procedure Summary       Date: 04/23/25 Room / Location: [facility] 01 / Virtual [facility] OR    Anesthesia Start: 1323 Anesthesia Stop: 1417    Procedures:       LITHOTRIPSY, ESWL (Right)      CYSTOSCOPY, WITH RETROGRADE PYELOGRAM (ESWL REP BLADIMIR ALONA) (Right) Diagnosis:       Right kidney stone      (Right kidney stone [N20.0])    Surgeons: Darnell Mcneal MD Responsible Provider: Madi Sampson MD    Anesthesia Type: general ASA Status: 2            Anesthesia Type: general    Vitals Value Taken Time   /75 04/23/25 14:27   Temp 36.5 °C (97.7 °F) 04/23/25 14:13   Pulse 87 04/23/25 14:27   Resp 16 04/23/25 14:27   SpO2 95 % 04/23/25 14:27       Anesthesia Post Evaluation    Patient location during evaluation: PACU  Patient participation: complete - patient participated  Level of consciousness: awake  Pain management: adequate  Airway patency: patent  Cardiovascular status: acceptable  Respiratory status: acceptable  Hydration status: acceptable  Postoperative Nausea and Vomiting: none        There were no known notable events for this encounter.

## 2025-04-23 NOTE — ANESTHESIA PROCEDURE NOTES
Airway  Date/Time: 4/23/2025 1:28 PM  Reason: elective      Staffing  Performed: CRNA   Authorized by: Madi Sampson MD    Performed by: EDY Smith-CESARIO  Patient location during procedure: OR    Patient Condition  Indications for airway management: anesthesia  Patient position: sniffing  Sedation level: deep     Final Airway Details   Preoxygenated: yes  Final airway type: supraglottic airway  Successful airway: unique  Size: 3  Number of attempts at approach: 1    Additional Comments  Atraumatic, dentition and soft tissue as preop following lma placement, neck remained neutral throughout.          
50-74%

## 2025-04-23 NOTE — ANESTHESIA PREPROCEDURE EVALUATION
Patient: Mariana Wharton    Procedure Information       Date/Time: 04/23/25 1410    Procedures:       LITHOTRIPSY, ESWL (Right)      CYSTOSCOPY, WITH RETROGRADE PYELOGRAM (ESWL REP BLADIMIR SAGE) (Right)    Location: [facility] 01 / Virtual [facility]    Surgeons: Darnell Mcneal MD            Relevant Problems   /Renal   (+) Acute cystitis without hematuria   (+) Kidney stone   (+) Right kidney stone      Hematology   (+) DVT (deep venous thrombosis) (Multi)     Medical History[1]     Clinical information reviewed:   Tobacco  Allergies  Meds   Med Hx  Surg Hx   Fam Hx  Soc Hx        NPO Detail:  NPO/Void Status  Date of Last Liquid: 04/23/25  Time of Last Liquid: 0900 (sip of water)  Date of Last Solid: 04/22/25  Time of Last Solid: 1800  Time of Last Void: 1105         Physical Exam    Airway  Mallampati: III  TM distance: >3 FB  Neck ROM: full     Cardiovascular    Dental    Pulmonary    Abdominal            Anesthesia Plan    History of general anesthesia?: yes  History of complications of general anesthesia?: no    ASA 2     general     intravenous induction   Anesthetic plan and risks discussed with patient.    Plan discussed with CRNA.           [1]   Past Medical History:  Diagnosis Date    DVT (deep venous thrombosis) (Multi)     Kidney stones     Obesity      
Female

## 2025-06-02 ENCOUNTER — APPOINTMENT (OUTPATIENT)
Dept: UROLOGY | Facility: CLINIC | Age: 65
End: 2025-06-02
Payer: MEDICARE

## 2025-06-02 DIAGNOSIS — R31.9 URINARY TRACT INFECTION WITH HEMATURIA, SITE UNSPECIFIED: Primary | ICD-10-CM

## 2025-06-02 DIAGNOSIS — N39.0 URINARY TRACT INFECTION WITH HEMATURIA, SITE UNSPECIFIED: Primary | ICD-10-CM

## 2025-06-02 DIAGNOSIS — N30.00 ACUTE CYSTITIS WITHOUT HEMATURIA: ICD-10-CM

## 2025-06-02 DIAGNOSIS — N20.0 KIDNEY STONES: ICD-10-CM

## 2025-06-02 DIAGNOSIS — R39.9 URINARY SYMPTOM OR SIGN: ICD-10-CM

## 2025-06-02 DIAGNOSIS — R82.90 ABNORMAL FINDING ON URINALYSIS: ICD-10-CM

## 2025-06-02 DIAGNOSIS — N20.0 KIDNEY STONE: ICD-10-CM

## 2025-06-02 LAB
POC APPEARANCE, URINE: CLEAR
POC BILIRUBIN, URINE: NEGATIVE
POC BLOOD, URINE: ABNORMAL
POC COLOR, URINE: YELLOW
POC GLUCOSE, URINE: NEGATIVE MG/DL
POC KETONES, URINE: NEGATIVE MG/DL
POC LEUKOCYTES, URINE: ABNORMAL
POC NITRITE,URINE: NEGATIVE
POC PH, URINE: 5 PH
POC PROTEIN, URINE: NEGATIVE MG/DL
POC SPECIFIC GRAVITY, URINE: >=1.03
POC UROBILINOGEN, URINE: 0.2 EU/DL

## 2025-06-02 PROCEDURE — 99214 OFFICE O/P EST MOD 30 MIN: CPT | Performed by: UROLOGY

## 2025-06-02 PROCEDURE — 1036F TOBACCO NON-USER: CPT | Performed by: UROLOGY

## 2025-06-02 PROCEDURE — 81003 URINALYSIS AUTO W/O SCOPE: CPT | Performed by: UROLOGY

## 2025-06-02 PROCEDURE — G2211 COMPLEX E/M VISIT ADD ON: HCPCS | Performed by: UROLOGY

## 2025-06-02 RX ORDER — NITROFURANTOIN 25; 75 MG/1; MG/1
100 CAPSULE ORAL 2 TIMES DAILY
Qty: 14 CAPSULE | Refills: 0 | Status: SHIPPED | OUTPATIENT
Start: 2025-06-02 | End: 2025-06-09

## 2025-06-02 RX ORDER — FERROUS SULFATE 325(65) MG
325 TABLET, DELAYED RELEASE (ENTERIC COATED) ORAL
COMMUNITY

## 2025-06-02 ASSESSMENT — PAIN SCALES - GENERAL: PAINLEVEL_OUTOF10: 0-NO PAIN

## 2025-06-02 NOTE — PATIENT INSTRUCTIONS
Your provider has ordered a 24 hour urine test.  This kit will be mailed to your house by [employer] through [employer].    Your provider has ordered blood work to be drawn.   has partnered with [employer], your orders can be sent electronically.  If you choose to use an outside lab, please take a copy of your lab requisite with you to your lab of choice and have them fax results to Dr. Mcneal at [phone].

## 2025-06-02 NOTE — PROGRESS NOTES
Patient is a 65 y.o. female presenting for 3-week postop visit status post right ESWL with PMH of kidney stones, gross hematuria, recurrent UTI, and left Bosniak 2F renal cyst.    SUBJECTIVE:  HPI   She presents for postop followup status post right ESWL 4/23/25. She was evaluated with cystoscopy and right retrograde pyelogram. She brought some stone fragments that she passed following her procedure. She has not had any recent pain.    She does have urinary frequency, however, states she drinks plenty of water. She denies urgency or urge incontinence.    She has history of recurrent UTI symptoms with gross hematuria  She has had a prior UTI with gross hematuria as well.  She is asymptomatic with UTI's other than hematuria. She denies recent gross hematuria or dysuria. No fevers or chills.    She has history of kidney stones.   Renal ultrasound in 12/24 identified a suspected 5 mm left upper pole stone, and 6 mm left lower pole stone.    She has history of bilateral hydronephrosis in the setting of diverticulitis. She has a colostomy.  She has met with Dr. Peters of nephrology in the past; however, has not followed routinely.  Medical History[1]  Surgical History[2]  Family History[3]  Social History[4]    Review of Systems   All systems have been reviewed and are negative unless otherwise noted in the HPI.    OBJECTIVE:  There were no vitals taken for this visit.  Physical Exam   Constitutional: No obvious distress.  Eyes: Non-injected conjunctiva, sclera clear, EOMI.  Ears/Nose/Mouth/Throat: No obvious drainage per ears or nose.  Cardiovascular: Extremities are warm and well perfused. No edema, cyanosis or pallor.  Respiratory: No audible wheezing/stridor; respirations do not appear labored.  Gastrointestinal: Abdomen soft, not distended.  Musculoskeletal: Normal ROM of extremities.  Skin: No obvious rashes or open sores.  Neurologic: Alert and oriented, CN 2-12 grossly intact.  Psychiatric: Answers questions  appropriately with normal affect.  Hematologic/Lymphatic/Immunologic: No obvious bruises or sites of spontaneous bleeding.  Genitourinary: No CVA tenderness, bladder not palpable.     LABS:  Lab Results   Component Value Date    WBC 7.4 04/21/2025    HGB 13.4 04/21/2025    HCT 42.3 04/21/2025    MCV 85 04/21/2025     04/21/2025    GLUCOSE 99 04/21/2025    CALCIUM 9.7 04/21/2025     04/21/2025    K 4.9 04/21/2025    CO2 25 04/21/2025     04/21/2025    BUN 17 04/21/2025    CREATININE 0.71 04/21/2025    EGFR >90 04/21/2025    URINECULTURE SEE NOTE 06/02/2025     IMAGING:  ==12/04/24==  US renal complete   IMPRESSION:   -Left lower pole Bosniak 2F renal cyst, minimally increased in size   compared to prior, but otherwise similar in appearance.   -Possible left nephrolithiasis.  No hydronephrosis.     === 04/11/25 ===  CT ABDOMEN PELVIS WO IV CONTRAST (Preliminary)  This result has not been signed. Information might be incomplete.  - Impression -  1. There are two nonobstructive right renal calculi measuring up to 6  mm in the right renal inter pole. No hydronephrosis.  2. Postsurgical changes of partial colectomy and a left lower  quadrant colostomy with parastomal herniation without evidence of  obstruction or strangulation.  3. Cholelithiasis without evidence acute cholecystitis.  4. Colonic diverticulosis without diverticulitis.  5. Additional chronic findings as above.    PROCEDURES:    ASSESSMENT/PLAN:  Problem List Items Addressed This Visit       Acute cystitis without hematuria    Kidney stone     Other Visit Diagnoses         Urinary tract infection with hematuria, site unspecified    -  Primary    Relevant Medications    nitrofurantoin, macrocrystal-monohydrate, (Macrobid) 100 mg capsule      Urinary symptom or sign        Relevant Orders    POCT UA Automated manually resulted (Completed)      Kidney stones        Relevant Orders    Calculi (Stone) Analysis (Completed)    Basic Metabolic  Panel    Parathyroid Hormone, Intact    Uric Acid      Abnormal finding on urinalysis        Relevant Orders    Urine Culture (Completed)    Microscopic Only, Urine (Completed)          She has history of non-obstructive right kidney stones, up to 6 mm. She underwent right ESWL with cystoscopy and retrograde pyelogram on 25. Her stones will be sent for analysis.    For stone prevention, stone labs and a 24-hour urine ordered. She will followup in 1 month in the office to discuss results.    She has had recurrent UTI's. She had a E.faecalis UTI 3/27/25 treated with nitrofurantoin. Urine culture 25 was negative. She has antibiotic allergy to amoxicillin. Urine had large blood and moderate leukocytes today (25) and will be sent for microscopy, culture, and sensitivity. She will start Macrobid 100 mg BID for 1 week.     She has history of left lower pole Bosniak 2F renal cyst identified on renal ultrasound 24.     She has history of bilateral hydronephrosis associated with colon diverticulitis. She has a colostomy.    RTC in 1 month.    All questions were answered to the patient’s satisfaction.  Patient agrees with the plan and wishes to proceed.  Follow-up will be scheduled appropriately.     Scribe Attestation  By signing my name below, I, Christiano Foley,   attest that this documentation has been prepared under the direction and in the presence of Darnell Mcneal MD.         [1]   Past Medical History:  Diagnosis Date    DVT (deep venous thrombosis) (Multi)     Kidney stones     Obesity    [2]   Past Surgical History:  Procedure Laterality Date     SECTION, CLASSIC      COLECTOMY PARTIAL / TOTAL      COLON SURGERY     [3] No family history on file.  [4]   Social History  Tobacco Use    Smoking status: Never    Smokeless tobacco: Never   Substance Use Topics    Alcohol use: Never    Drug use: Never

## 2025-06-05 LAB
BACTERIA #/AREA URNS HPF: ABNORMAL /HPF
BACTERIA UR CULT: NORMAL
CAOX CRY #/AREA URNS HPF: ABNORMAL /HPF
COMPN STONE: NORMAL
HYALINE CASTS #/AREA URNS LPF: ABNORMAL /LPF
RBC #/AREA URNS HPF: ABNORMAL /HPF
SERVICE CMNT-IMP: ABNORMAL
SQUAMOUS #/AREA URNS HPF: ABNORMAL /HPF
WBC #/AREA URNS HPF: ABNORMAL /HPF
WT STONE: 0.03 G

## 2025-06-13 LAB
ANION GAP SERPL CALCULATED.4IONS-SCNC: 10 MMOL/L (CALC) (ref 7–17)
BUN SERPL-MCNC: 16 MG/DL (ref 7–25)
BUN/CREAT SERPL: NORMAL (CALC) (ref 6–22)
CALCIUM SERPL-MCNC: 9.3 MG/DL (ref 8.6–10.4)
CHLORIDE SERPL-SCNC: 105 MMOL/L (ref 98–110)
CO2 SERPL-SCNC: 25 MMOL/L (ref 20–32)
CREAT SERPL-MCNC: 0.79 MG/DL (ref 0.5–1.05)
EGFRCR SERPLBLD CKD-EPI 2021: 84 ML/MIN/1.73M2
GLUCOSE SERPL-MCNC: 92 MG/DL (ref 65–99)
POTASSIUM SERPL-SCNC: 4.4 MMOL/L (ref 3.5–5.3)
PTH-INTACT SERPL-MCNC: 85 PG/ML (ref 16–77)
SODIUM SERPL-SCNC: 140 MMOL/L (ref 135–146)
URATE SERPL-MCNC: 5 MG/DL (ref 2.5–7)

## 2025-08-18 ENCOUNTER — APPOINTMENT (OUTPATIENT)
Dept: UROLOGY | Facility: CLINIC | Age: 65
End: 2025-08-18
Payer: MEDICARE

## 2025-08-18 DIAGNOSIS — N20.0 KIDNEY STONE: Primary | ICD-10-CM

## 2025-08-18 DIAGNOSIS — R82.81 PYURIA: ICD-10-CM

## 2025-08-18 DIAGNOSIS — R39.9 UTI SYMPTOMS: ICD-10-CM

## 2025-08-18 DIAGNOSIS — R39.9 URINARY SYMPTOM OR SIGN: ICD-10-CM

## 2025-08-18 LAB
POC APPEARANCE, URINE: CLEAR
POC BILIRUBIN, URINE: NEGATIVE
POC BLOOD, URINE: ABNORMAL
POC COLOR, URINE: YELLOW
POC GLUCOSE, URINE: NEGATIVE MG/DL
POC KETONES, URINE: NEGATIVE MG/DL
POC LEUKOCYTES, URINE: ABNORMAL
POC NITRITE,URINE: NEGATIVE
POC PH, URINE: 5 PH
POC PROTEIN, URINE: ABNORMAL MG/DL
POC SPECIFIC GRAVITY, URINE: >=1.03
POC UROBILINOGEN, URINE: 0.2 EU/DL

## 2025-08-18 ASSESSMENT — PAIN SCALES - GENERAL: PAINLEVEL_OUTOF10: 0-NO PAIN

## 2025-08-19 RX ORDER — NITROFURANTOIN 25; 75 MG/1; MG/1
100 CAPSULE ORAL 2 TIMES DAILY
Qty: 14 CAPSULE | Refills: 0 | Status: SHIPPED | OUTPATIENT
Start: 2025-08-19 | End: 2025-08-26

## 2025-08-27 DIAGNOSIS — R39.9 UTI SYMPTOMS: ICD-10-CM

## 2025-12-15 ENCOUNTER — APPOINTMENT (OUTPATIENT)
Dept: UROLOGY | Facility: CLINIC | Age: 65
End: 2025-12-15
Payer: MEDICARE

## (undated) DEVICE — PREP TRAY, SKIN, DRY, W/GLOVES

## (undated) DEVICE — CATHETER, URETERAL, POLLACK, OPEN END, 5.5 FR, 70 CM

## (undated) DEVICE — GLOVE, PROTEXIS PI CLASSIC, SZ-7.5, PF, LF

## (undated) DEVICE — SOLUTION, IRRIGATION, X RX SODIUM CHL 0.9%, 1000ML BTL

## (undated) DEVICE — ASCOPE 4 CYSTO - REVERSE DEFLECTION

## (undated) DEVICE — IRRIGATION SET, CYSTOSCOPY, F/CONSTANT/INTERMITTENT, 8 GTT/CC, 77 IN

## (undated) DEVICE — Device